# Patient Record
Sex: MALE | URBAN - METROPOLITAN AREA
[De-identification: names, ages, dates, MRNs, and addresses within clinical notes are randomized per-mention and may not be internally consistent; named-entity substitution may affect disease eponyms.]

---

## 2018-06-12 ENCOUNTER — OFFICE VISIT (OUTPATIENT)
Dept: URBAN - METROPOLITAN AREA CLINIC 33 | Facility: CLINIC | Age: 73
End: 2018-06-12
Payer: COMMERCIAL

## 2018-06-12 DIAGNOSIS — H44.752 RETAINED (NONMAGNETIC) (OLD) FOREIGN BODY IN VITREOUS BODY, LEFT EYE: Primary | ICD-10-CM

## 2018-06-12 PROCEDURE — 92134 CPTRZ OPH DX IMG PST SGM RTA: CPT | Performed by: OPHTHALMOLOGY

## 2018-06-12 PROCEDURE — 99213 OFFICE O/P EST LOW 20 MIN: CPT | Performed by: OPHTHALMOLOGY

## 2018-06-12 ASSESSMENT — INTRAOCULAR PRESSURE
OD: 15
OS: 18

## 2018-06-12 NOTE — IMPRESSION/PLAN
Impression: Retained (nonmagnetic) (old) foreign body in vitreous body, left eye: H44.752. oil filled eye Plan: The retina continues to be attached and stable after surgical repair with silicone oil. In order to maximize the visual potential and avoid the potential complications of intraocular silicone oil. Surgical removal is advised. After a through discussion of surgical R/B/A. The patient understands the potential risks of sx, including (but not limited to) bleeding, pain, infection, loss of vision, loss of eye and possible need for more sx, especially if the retina re-detaches after the removal of oil. The patient agrees to proceed with sx. RL-3.